# Patient Record
Sex: MALE | Race: WHITE | Employment: OTHER | URBAN - METROPOLITAN AREA
[De-identification: names, ages, dates, MRNs, and addresses within clinical notes are randomized per-mention and may not be internally consistent; named-entity substitution may affect disease eponyms.]

---

## 2024-07-31 ENCOUNTER — OFFICE VISIT (OUTPATIENT)
Age: 69
End: 2024-07-31
Payer: MEDICARE

## 2024-07-31 VITALS — WEIGHT: 166 LBS | HEIGHT: 71 IN | BODY MASS INDEX: 23.24 KG/M2 | RESPIRATION RATE: 16 BRPM

## 2024-07-31 DIAGNOSIS — M19.012 PRIMARY OSTEOARTHRITIS, LEFT SHOULDER: ICD-10-CM

## 2024-07-31 DIAGNOSIS — M25.512 CHRONIC LEFT SHOULDER PAIN: Primary | ICD-10-CM

## 2024-07-31 DIAGNOSIS — G89.29 CHRONIC LEFT SHOULDER PAIN: Primary | ICD-10-CM

## 2024-07-31 PROCEDURE — G8420 CALC BMI NORM PARAMETERS: HCPCS | Performed by: ORTHOPAEDIC SURGERY

## 2024-07-31 PROCEDURE — G8427 DOCREV CUR MEDS BY ELIG CLIN: HCPCS | Performed by: ORTHOPAEDIC SURGERY

## 2024-07-31 PROCEDURE — 1036F TOBACCO NON-USER: CPT | Performed by: ORTHOPAEDIC SURGERY

## 2024-07-31 PROCEDURE — 99203 OFFICE O/P NEW LOW 30 MIN: CPT | Performed by: ORTHOPAEDIC SURGERY

## 2024-07-31 PROCEDURE — 3017F COLORECTAL CA SCREEN DOC REV: CPT | Performed by: ORTHOPAEDIC SURGERY

## 2024-07-31 PROCEDURE — 1123F ACP DISCUSS/DSCN MKR DOCD: CPT | Performed by: ORTHOPAEDIC SURGERY

## 2024-07-31 PROCEDURE — 73030 X-RAY EXAM OF SHOULDER: CPT | Performed by: ORTHOPAEDIC SURGERY

## 2024-07-31 NOTE — PROGRESS NOTES
[facility] AMBULATORY OFFICE NOTE      Patient: Reji Moreno                MRN: [id number]       SSN: [id number]  YOB: 1955        AGE: 69 y.o.        SEX: male  Body mass index is 23.15 kg/m².    PCP: Rosey Mckeon PA  07/31/24    CHIEF COMPLAINT: Left shoulder pain    HPI: Reji Moreno is a 69 y.o. male patient who complains of 7 to 8 months of left shoulder pain.  He denies any specific injury to his shoulder recently or in the past.  He did notice the pain after he slept with his arm in an abducted externally rotated position with his hand behind his head.  Since that time is been having intermittent pain that is worse at night when he lies on the shoulder.  He also feels like he has popped his shoulder out of place and gone to a chiropractor several times and it popped back into place.  When he does this he says he gets relief for about a week but is not sustained.    Past Medical History:   Diagnosis Date    Chest pain 09/04/2013    Dyslipidemia, goal LDL below 70     HTN (hypertension)     Hypertriglyceridemia     Prostate cancer (HCC)     Rectal discomfort 07/18/2018    Retinal tear     Sleep apnea     uses CPAP       Family History   Problem Relation Age of Onset    Retinal Detachment Father     Retinal Detachment Brother     Heart Failure Father 85    Kidney Disease Mother     Stroke Mother 45       Current Outpatient Medications   Medication Sig Dispense Refill    aspirin 81 MG EC tablet Take by mouth daily      coenzyme Q10 100 MG CAPS capsule Take 100 mg by mouth 2 times daily      Empagliflozin-metFORMIN HCl ER (SYNJARDY XR) 5-1000 MG TB24 ceived the following from [facility]: Outside name: SYNJARDY XR 5-1,000 mg TBph      Evolocumab (REPATHA SURECLICK) 140 MG/ML SOAJ ceived the following from [facility]: Outside name: REPATHA SURECLICK pen injection      lisinopril (PRINIVIL;ZESTRIL) 20 MG tablet ceived the